# Patient Record
Sex: MALE | Race: BLACK OR AFRICAN AMERICAN | NOT HISPANIC OR LATINO | URBAN - METROPOLITAN AREA
[De-identification: names, ages, dates, MRNs, and addresses within clinical notes are randomized per-mention and may not be internally consistent; named-entity substitution may affect disease eponyms.]

---

## 2018-08-17 ENCOUNTER — EMERGENCY (EMERGENCY)
Facility: HOSPITAL | Age: 23
LOS: 0 days | Discharge: HOME | End: 2018-08-17
Attending: EMERGENCY MEDICINE | Admitting: EMERGENCY MEDICINE
Payer: COMMERCIAL

## 2018-08-17 VITALS
HEART RATE: 66 BPM | DIASTOLIC BLOOD PRESSURE: 81 MMHG | SYSTOLIC BLOOD PRESSURE: 120 MMHG | OXYGEN SATURATION: 100 % | RESPIRATION RATE: 17 BRPM

## 2018-08-17 VITALS
RESPIRATION RATE: 18 BRPM | HEART RATE: 64 BPM | TEMPERATURE: 99 F | OXYGEN SATURATION: 100 % | DIASTOLIC BLOOD PRESSURE: 80 MMHG | SYSTOLIC BLOOD PRESSURE: 119 MMHG

## 2018-08-17 DIAGNOSIS — G56.32 LESION OF RADIAL NERVE, LEFT UPPER LIMB: ICD-10-CM

## 2018-08-17 PROCEDURE — 73090 X-RAY EXAM OF FOREARM: CPT | Mod: 26,LT

## 2018-08-17 PROCEDURE — 99283 EMERGENCY DEPT VISIT LOW MDM: CPT

## 2018-08-17 RX ORDER — SODIUM CHLORIDE 9 MG/ML
1000 INJECTION INTRAMUSCULAR; INTRAVENOUS; SUBCUTANEOUS ONCE
Qty: 0 | Refills: 0 | Status: DISCONTINUED | OUTPATIENT
Start: 2018-08-17 | End: 2018-08-17

## 2018-08-17 RX ORDER — SODIUM CHLORIDE 9 MG/ML
3 INJECTION INTRAMUSCULAR; INTRAVENOUS; SUBCUTANEOUS ONCE
Qty: 0 | Refills: 0 | Status: DISCONTINUED | OUTPATIENT
Start: 2018-08-17 | End: 2018-08-17

## 2018-08-17 RX ORDER — FAMOTIDINE 10 MG/ML
20 INJECTION INTRAVENOUS ONCE
Qty: 0 | Refills: 0 | Status: DISCONTINUED | OUTPATIENT
Start: 2018-08-17 | End: 2018-08-17

## 2018-08-17 NOTE — ED PROVIDER NOTE - OBJECTIVE STATEMENT
22 y/o male with no relevant PMHx  presents to the ED c/o  inability to extend left fingers today. No hx trauma. Pt flew in from [place]. No fever or any other acute complaints at this time.

## 2018-08-17 NOTE — ED ADULT NURSE NOTE - CHIEF COMPLAINT QUOTE
Pt states he has numbness in his left arm with pain in his left forearm, Pt states the numbness started while he was waiting to get on a plane on Mon 8/16, then took 2 flights to [place], numbness became worse and 2 flights back, arriving last night. Patient has full range of motion of his shoulder but is unable to flex/extend his left wrist fully and unable to wiggle his fingers at full motion. Pt has decreased radial pulse in left wrist compared to right radial pulse. Unequal strength of radial pulses. Capillary refill <3 seconds in left fingers, left hand is sweaty. Pt direct bed to main ED for evaluation.

## 2018-08-17 NOTE — ED PROVIDER NOTE - NEUROLOGICAL, MLM
Alert and oriented, no focal deficits. Median nerve intact, ulnar nerve intact, radial nerve-pt unable to extend all fingers on left hand.

## 2018-08-17 NOTE — ED ADULT NURSE NOTE - OBJECTIVE STATEMENT
pt began feeling a slight discomfort in left forearm before flying to/from [place] three days ago. Pt is unable to open his left hand more than 90% , can be opened without pain or resistance.

## 2018-08-17 NOTE — ED ADULT TRIAGE NOTE - CHIEF COMPLAINT QUOTE
Pt states he has numbness in his left arm with pain in his left forearm, Pt states the numbness started while he was waiting to get on a plane on Mon 8/16, then took 2 flights to [place], numbness became worse and 2 flights back, arriving last night. Patient has full range of motion of his shoulder but is unable to flex/extend his left wrist fully and unable to wiggle his fingers at full motion. Pt has decreased radial pulse in left wrist compared to right radial pulse. Unequal strength of radial pulses. Capillary refill <3 seconds in left fingers, left hand is sweaty. Pt direct bed to main ED for evaluation.

## 2019-01-12 ENCOUNTER — EMERGENCY (EMERGENCY)
Facility: HOSPITAL | Age: 24
LOS: 0 days | Discharge: HOME | End: 2019-01-12
Attending: EMERGENCY MEDICINE | Admitting: EMERGENCY MEDICINE
Payer: COMMERCIAL

## 2019-01-12 VITALS — HEIGHT: 61 IN | WEIGHT: 145.06 LBS

## 2019-01-12 VITALS
RESPIRATION RATE: 19 BRPM | SYSTOLIC BLOOD PRESSURE: 148 MMHG | TEMPERATURE: 99 F | DIASTOLIC BLOOD PRESSURE: 89 MMHG | OXYGEN SATURATION: 100 % | HEART RATE: 66 BPM

## 2019-01-12 DIAGNOSIS — N48.1 BALANITIS: ICD-10-CM

## 2019-01-12 PROCEDURE — 99283 EMERGENCY DEPT VISIT LOW MDM: CPT

## 2019-01-12 RX ORDER — AZTREONAM 2 G
1 VIAL (EA) INJECTION
Qty: 14 | Refills: 0 | OUTPATIENT
Start: 2019-01-12 | End: 2019-01-18

## 2019-01-12 RX ADMIN — Medication 1 TABLET(S): at 09:25

## 2019-01-12 NOTE — ED PROVIDER NOTE - OBJECTIVE STATEMENT
22 yo male pw blister and redness on the tip and shaft of his penis that started yesterday and is worsening today. no fever or chills. [facility] and has an appointment with a urologist in 3 days. no fever or chills. 24 yo male pw non painful non tender blister and redness on the tip and shaft of his penis that started yesterday and is worsening today. no fever or chills. [facility] and has an appointment with a urologist in 3 days. no fever or chills.

## 2019-01-12 NOTE — ED ADULT TRIAGE NOTE - CHIEF COMPLAINT QUOTE
patient reports cyst on genitals that started yesterday.  [facility] and told to follow up with urologist.  This morning patient states there is blood around the cyst. Denies drainage from cyst, fevers or pain

## 2019-01-12 NOTE — ED PROVIDER NOTE - CARE PROVIDER_API CALL
Charles Medina (MD), Urology  [address]  [address] [place] [address]  Phone: [phone]  Fax: [phone]

## 2019-01-12 NOTE — ED PROVIDER NOTE - MEDICAL DECISION MAKING DETAILS
pt with blister with surrounding redness of penis likely infection will get urology consult and give antibiotics

## 2021-03-31 ENCOUNTER — TRANSCRIPTION ENCOUNTER (OUTPATIENT)
Age: 26
End: 2021-03-31

## 2021-06-05 ENCOUNTER — TRANSCRIPTION ENCOUNTER (OUTPATIENT)
Age: 26
End: 2021-06-05

## 2021-06-10 NOTE — ED PROVIDER NOTE - CPE EDP SKIN NORM
Body Location Override (Optional - Billing Will Still Be Based On Selected Body Map Location If Applicable): left jawline
Detail Level: Detailed
Add 72799 Cpt? (Important Note: In 2017 The Use Of 80262 Is Being Tracked By Cms To Determine Future Global Period Reimbursement For Global Periods): yes
normal...

## 2022-05-01 ENCOUNTER — EMERGENCY (EMERGENCY)
Facility: HOSPITAL | Age: 27
LOS: 0 days | Discharge: HOME | End: 2022-05-01
Attending: HOSPITALIST
Payer: COMMERCIAL

## 2022-05-01 VITALS — WEIGHT: 147.93 LBS | HEIGHT: 61 IN

## 2022-05-01 VITALS
DIASTOLIC BLOOD PRESSURE: 73 MMHG | HEART RATE: 99 BPM | OXYGEN SATURATION: 100 % | SYSTOLIC BLOOD PRESSURE: 124 MMHG | RESPIRATION RATE: 20 BRPM | TEMPERATURE: 98 F

## 2022-05-01 DIAGNOSIS — J45.909 UNSPECIFIED ASTHMA, UNCOMPLICATED: ICD-10-CM

## 2022-05-01 DIAGNOSIS — Z88.2 ALLERGY STATUS TO SULFONAMIDES: ICD-10-CM

## 2022-05-01 DIAGNOSIS — I10 ESSENTIAL (PRIMARY) HYPERTENSION: ICD-10-CM

## 2022-05-01 DIAGNOSIS — R42 DIZZINESS AND GIDDINESS: ICD-10-CM

## 2022-05-01 DIAGNOSIS — R10.9 UNSPECIFIED ABDOMINAL PAIN: ICD-10-CM

## 2022-05-01 DIAGNOSIS — Z20.822 CONTACT WITH AND (SUSPECTED) EXPOSURE TO COVID-19: ICD-10-CM

## 2022-05-01 DIAGNOSIS — R11.2 NAUSEA WITH VOMITING, UNSPECIFIED: ICD-10-CM

## 2022-05-01 DIAGNOSIS — Z79.82 LONG TERM (CURRENT) USE OF ASPIRIN: ICD-10-CM

## 2022-05-01 DIAGNOSIS — K29.70 GASTRITIS, UNSPECIFIED, WITHOUT BLEEDING: ICD-10-CM

## 2022-05-01 PROCEDURE — 72125 CT NECK SPINE W/O DYE: CPT | Mod: 26,MA

## 2022-05-01 PROCEDURE — 70486 CT MAXILLOFACIAL W/O DYE: CPT | Mod: 26,MA

## 2022-05-01 PROCEDURE — 90715 TDAP VACCINE 7 YRS/> IM: CPT

## 2022-05-01 PROCEDURE — 76376 3D RENDER W/INTRP POSTPROCES: CPT | Mod: 26

## 2022-05-01 PROCEDURE — 70486 CT MAXILLOFACIAL W/O DYE: CPT | Mod: MA

## 2022-05-01 PROCEDURE — 70450 CT HEAD/BRAIN W/O DYE: CPT | Mod: 26,MA

## 2022-05-01 PROCEDURE — 99285 EMERGENCY DEPT VISIT HI MDM: CPT | Mod: 25

## 2022-05-01 PROCEDURE — 76376 3D RENDER W/INTRP POSTPROCES: CPT

## 2022-05-01 PROCEDURE — 99285 EMERGENCY DEPT VISIT HI MDM: CPT

## 2022-05-01 PROCEDURE — 72125 CT NECK SPINE W/O DYE: CPT | Mod: MA

## 2022-05-01 PROCEDURE — 70450 CT HEAD/BRAIN W/O DYE: CPT | Mod: MA

## 2022-05-01 RX ORDER — TETANUS TOXOID, REDUCED DIPHTHERIA TOXOID AND ACELLULAR PERTUSSIS VACCINE, ADSORBED 5; 2.5; 8; 8; 2.5 [IU]/.5ML; [IU]/.5ML; UG/.5ML; UG/.5ML; UG/.5ML
0.5 SUSPENSION INTRAMUSCULAR ONCE
Refills: 0 | Status: COMPLETED | OUTPATIENT
Start: 2022-05-01 | End: 2022-05-01

## 2022-05-01 RX ADMIN — TETANUS TOXOID, REDUCED DIPHTHERIA TOXOID AND ACELLULAR PERTUSSIS VACCINE, ADSORBED 0.5 MILLILITER(S): 5; 2.5; 8; 8; 2.5 SUSPENSION INTRAMUSCULAR at 03:55

## 2022-05-01 NOTE — ED PROVIDER NOTE - PATIENT PORTAL LINK FT
You can access the FollowMyHealth Patient Portal offered by [facility] by registering at the following website: [email or web address]. By joining [facility]’s FollowMyHealth portal, you will also be able to view your health information using other applications (apps) compatible with our system.
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
## 2022-05-01 NOTE — ED PROVIDER NOTE - NSFOLLOWUPINSTRUCTIONS_ED_ALL_ED_FT
Take tylenol as needed for pain, 650Mg every 6-8 hours.  You can also take ibuprofen as needed for pain, 600mg every 6-8 hours, take with food.  Please do not apply any creams or ointments to stitches or surgical glue. the water can run over these area but do not scrub  please return in 1 week for removal of stitches.

## 2022-05-01 NOTE — ED PROVIDER NOTE - ENMT, MLM
Airway patent, Nasal mucosa clear. no nasal septal hematoma. Mouth with normal mucosa and normal alignment of jaw. no missing/loose or broken teeth. Throat has no vesicles, no oropharyngeal exudates and uvula is midline. no hemotympanum.
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
## 2022-05-01 NOTE — ED PROVIDER NOTE - SKIN [+], MLM
lac to forehead, bridge of nose and upper left cheek. abrasion to back of lower left scalp/ABRASION/LACERATION

## 2022-05-01 NOTE — ED PROVIDER NOTE - PROGRESS NOTE DETAILS
Julian GARAY: CT results noted and d/w patient. did not expresses interest in oupt Plastic Surgery referral for broken nasal bone. evaluated again for nasal septal hematoma which is not seen on exam. wounds cleansed and repaired. pls see procedure note.

## 2022-05-01 NOTE — ED PROVIDER NOTE - EYES, MLM
Clear bilaterally, pupils equal, round and reactive to light. no hyphema. + subconjunctival hemorrhage on the left eye

## 2022-05-08 ENCOUNTER — EMERGENCY (EMERGENCY)
Facility: HOSPITAL | Age: 27
LOS: 0 days | Discharge: HOME | End: 2022-05-08
Attending: EMERGENCY MEDICINE
Payer: COMMERCIAL

## 2022-05-08 VITALS
SYSTOLIC BLOOD PRESSURE: 126 MMHG | HEART RATE: 84 BPM | TEMPERATURE: 99 F | DIASTOLIC BLOOD PRESSURE: 81 MMHG | RESPIRATION RATE: 16 BRPM | OXYGEN SATURATION: 98 %

## 2022-05-08 VITALS — WEIGHT: 147.93 LBS | HEIGHT: 61 IN

## 2022-05-08 DIAGNOSIS — S01.81XD LACERATION WITHOUT FOREIGN BODY OF OTHER PART OF HEAD, SUBSEQUENT ENCOUNTER: ICD-10-CM
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
## 2022-05-08 PROCEDURE — G0463: CPT

## 2022-05-08 PROCEDURE — L9995: CPT

## 2022-05-08 NOTE — ED STATDOCS - NSFOLLOWUPINSTRUCTIONS_ED_ALL_ED_FT
Stitches Removal    WHAT YOU NEED TO KNOW:    What do I need to know about stitches removal? Stitches are usually removed within 14 days, depending on the location of the wound. Your healthcare provider will tell you when to return to have your stitches removed. Your provider will use sterile forceps or tweezers to  the knot of each stitch. He or she will cut the stitch with scissors and pull the stitch out. You may feel a slight tug as the stitch comes out.    What can I do to care for the area after the stitches are removed?   •Do not pull medical tape off. Your provider may place small strips of medical tape across your wound after the stitches have been removed. These strips will peel and fall of on their own. Do not pull them off.      •Clean the area as directed. Carefully wash the area with soap and water. Pat the area dry with a clean towel. Check the area for signs of infection, such as redness, swelling, or pus. Also check that the wound is not coming apart.      •Protect your wound. Your wound can swell, bleed, or split open if it is stretched or bumped. You may need to wear a bandage that supports your wound until it is completely healed.      •Care for a scar. You may have a scar after the stitches are removed. Use sunblock if the area is exposed to the sun. Apply it every day after the stitches are removed. This will help prevent skin discoloration. Talk to your healthcare provider about medicines you can use to make the scar less visible. Some medicines are available without a prescription.      When should I seek immediate care?   •Your wound splits open or is starting to come apart.      •You suddenly cannot move your injured joint.      •You have sudden numbness around your wound.      •You see red streaks coming from your wound.      When should I contact my healthcare provider?   •You have a fever and chills.      •Your wound is red, warm, swollen, or leaking pus.      •There is a bad smell coming from your wound.      •You have increased pain in the wound area.      •You have questions or concerns about your condition or care.      CARE AGREEMENT:    You have the right to help plan your care. Learn about your health condition and how it may be treated. Discuss treatment options with your healthcare providers to decide what care you want to receive. You always have the right to refuse treatment

## 2022-05-08 NOTE — ED STATDOCS - NS ED ATTENDING STATEMENT MOD
This was a shared visit with the JAIRO. I reviewed and verified the documentation and independently performed the documented:

## 2022-05-08 NOTE — ED ADULT TRIAGE NOTE - ESI TRIAGE ACUITY LEVEL, MLM
"  lisinopril (PRINIVIL/ZESTRIL) 5 MG tablet 90 tablet 0 6/22/2018       Last Written Prescription Date:  06/22/2018  Last Fill Quantity: 90,  # refills: 0   Last office visit: 8/18/2017 with prescribing provider:     Future Office Visit: Unknown  Next 5 appointments (look out 90 days)     Oct 02, 2018 10:15 AM CDT   Return Visit with Satya Rogers MD   [facility] ([facility])    [address] [place] [id number] [phone] OPT 2                 Requested Prescriptions   Pending Prescriptions Disp Refills     lisinopril (PRINIVIL/ZESTRIL) 5 MG tablet [Pharmacy Med Name: LISINOPRIL 5MG TABLETS] 90 tablet 0     Sig: TAKE 1 TABLET BY MOUTH EVERY DAY    ACE Inhibitors (Including Combos) Protocol Passed    8/8/2018 10:26 AM       Passed - Blood pressure under 140/90 in past 12 months    BP Readings from Last 3 Encounters:   08/18/17 121/83   06/08/17 126/76   05/25/17 111/82                Passed - Recent (12 mo) or future (30 days) visit within the authorizing provider's specialty    Patient had office visit in the last 12 months or has a visit in the next 30 days with authorizing provider or within the authorizing provider's specialty.  See \"Patient Info\" tab in inbasket, or \"Choose Columns\" in Meds & Orders section of the refill encounter.           Passed - Patient is age 18 or older       Passed - Normal serum creatinine on file in past 12 months    Recent Labs   Lab Test  08/18/17   1030   CR  0.80            Passed - Normal serum potassium on file in past 12 months    Recent Labs   Lab Test  08/18/17   1030   POTASSIUM  4.0               " 5

## 2022-05-08 NOTE — ED STATDOCS - SKIN, MLM
skin normal color for race, warm,+1 cm healing wound to forehead and 1cm healing wound to bridge on nose, no wound dehiscence, no sign of infection.

## 2022-05-08 NOTE — ED STATDOCS - PATIENT PORTAL LINK FT
You can access the FollowMyHealth Patient Portal offered by [facility] by registering at the following website: [email or web address]. By joining [facility]’s FollowMyHealth portal, you will also be able to view your health information using other applications (apps) compatible with our system.

## 2022-06-27 ENCOUNTER — EMERGENCY (EMERGENCY)
Facility: HOSPITAL | Age: 27
LOS: 0 days | Discharge: HOME | End: 2022-06-27
Attending: EMERGENCY MEDICINE
Payer: COMMERCIAL

## 2022-06-27 VITALS
RESPIRATION RATE: 18 BRPM | DIASTOLIC BLOOD PRESSURE: 82 MMHG | TEMPERATURE: 99 F | SYSTOLIC BLOOD PRESSURE: 133 MMHG | HEART RATE: 68 BPM | WEIGHT: 197.98 LBS | HEIGHT: 61 IN | OXYGEN SATURATION: 98 %

## 2022-06-27 VITALS
DIASTOLIC BLOOD PRESSURE: 80 MMHG | SYSTOLIC BLOOD PRESSURE: 128 MMHG | HEART RATE: 72 BPM | OXYGEN SATURATION: 98 % | RESPIRATION RATE: 18 BRPM

## 2022-06-27 DIAGNOSIS — L02.411 CUTANEOUS ABSCESS OF RIGHT AXILLA: ICD-10-CM

## 2022-06-27 PROCEDURE — 10061 I&D ABSCESS COMP/MULTIPLE: CPT

## 2022-06-27 PROCEDURE — 99283 EMERGENCY DEPT VISIT LOW MDM: CPT | Mod: 25

## 2022-06-27 PROCEDURE — 87070 CULTURE OTHR SPECIMN AEROBIC: CPT

## 2022-06-27 PROCEDURE — 87075 CULTR BACTERIA EXCEPT BLOOD: CPT

## 2022-06-27 PROCEDURE — 87077 CULTURE AEROBIC IDENTIFY: CPT

## 2022-06-27 NOTE — ED STATDOCS - NSFOLLOWUPINSTRUCTIONS_ED_ALL_ED_FT
REMOVE YOUR PACKING IN 2 DAYS. FOLLOW UP WITH A SURGEON IN 1 WEEK. RETURN TO ER FOR ANY WORSENING SYMPTOMS OR NEW CONCERNS.     Skin Abscess   A skin abscess is an infected area on or under your skin that contains pus and other material. An abscess can happen almost anywhere on your body. Some abscesses break open (rupture) on their own. Most continue to get worse unless they are treated. The infection can spread deeper into the body and into your blood, which can make you feel sick. Treatment usually involves draining the abscess.    Follow these instructions at home:  Abscess Care     If you have an abscess that has not drained, place a warm, clean, wet washcloth over the abscess several times a day. Do this as told by your doctor.  Follow instructions from your doctor about how to take care of your abscess. Make sure you:    Cover the abscess with a bandage (dressing).  Change your bandage or gauze as told by your doctor.  Wash your hands with soap and water before you change the bandage or gauze. If you cannot use soap and water, use hand .    Check your abscess every day for signs that the infection is getting worse. Check for:    More redness, swelling, or pain.  More fluid or blood.  Warmth.  More pus or a bad smell.    Medicines     Image   Take over-the-counter and prescription medicines only as told by your doctor.  If you were prescribed an antibiotic medicine, take it as told by your doctor. Do not stop taking the antibiotic even if you start to feel better.  General instructions     To avoid spreading the infection:    Do not share personal care items, towels, or hot tubs with others.  Avoid making skin-to-skin contact with other people.    Keep all follow-up visits as told by your doctor. This is important.  Contact a doctor if:  You have more redness, swelling, or pain around your abscess.  You have more fluid or blood coming from your abscess.  Your abscess feels warm when you touch it.  You have more pus or a bad smell coming from your abscess.  You have a fever.  Your muscles ache.  You have chills.  You feel sick.  Get help right away if:  You have very bad (severe) pain.  You see red streaks on your skin spreading away from the abscess.

## 2022-06-27 NOTE — ED STATDOCS - ATTENDING APP SHARED VISIT CONTRIBUTION OF CARE
I personally saw the patient with the JAIRO, and completed the key components of the history and physical exam. I then discussed the management plan with the JAIRO.

## 2022-06-27 NOTE — ED ADULT NURSE NOTE - CHIEF COMPLAINT QUOTE
Daily Assessment c/o right axillary abscess for past week, has been on antibiotic for past week, abscess opened with drainage, swelling increased, c/o pain at site

## 2022-06-27 NOTE — ED ADULT TRIAGE NOTE - CHIEF COMPLAINT QUOTE
c/o right axillary abscess for past week, has been on antibiotic for past week, abscess opened with drainage, swelling increased, c/o pain at site

## 2022-06-27 NOTE — ED STATDOCS - SKIN, MLM
skin normal color for race, warm, dry. 4.5x2.5cm area of fluctuance right axilla. +erythema, +tenderness.

## 2022-06-27 NOTE — ED STATDOCS - PROGRESS NOTE DETAILS
signed Helen May PA-C Pt seen initially in intake by Dr. Tiwari   27m with right axiallry painful swollen region x 1 week. no trauma or abx, no fever. Pt with draining abscess, I&D in ED with lots of gray sebaceous material. Start on doxy. Packing placed, cx sent. pt feeling better. Possibly infected cyst. recommend pt remove packing in 2 days and f/u with sx. return precautions given. Pt feeling well at DC, agrees with DC and plan of care.

## 2022-06-27 NOTE — ED ADULT NURSE NOTE - NSIMPLEMENTINTERV_GEN_ALL_ED
Implemented All Universal Safety Interventions:  [place] to call system. Call bell, personal items and telephone within reach. Instruct patient to call for assistance. Room bathroom lighting operational. Non-slip footwear when patient is off stretcher. Physically safe environment: no spills, clutter or unnecessary equipment. Stretcher in lowest position, wheels locked, appropriate side rails in place.

## 2022-06-27 NOTE — ED STATDOCS - OBJECTIVE STATEMENT
26 y/o male presents to the ED for right axillary abscess x1 week. Pt was seen at urgent care and has been on Cefadroxil. Nonsmoker. No other complaints at this time.

## 2022-06-27 NOTE — ED STATDOCS - PATIENT PORTAL LINK FT
You can access the FollowMyHealth Patient Portal offered by [facility] by registering at the following website: [email or web address]. By joining [facility]’s FollowMyHealth portal, you will also be able to view your health information using other applications (apps) compatible with our system.

## 2022-06-27 NOTE — ED STATDOCS - CARE PROVIDER_API CALL
Shaka Lambert (DO)  Surgery; Surgical Critical Care  [address], [address] [place] [address]  Phone: [phone]  Fax: [phone]  Follow Up Time:

## 2022-06-27 NOTE — ED ADULT NURSE NOTE - SKIN TURGOR
resilient/elastic I have personally performed a face to face diagnostic evaluation on this patient. I have reviewed the ACP note and agree with the history, exam and plan of care, except as noted.

## 2022-06-29 NOTE — ED POST DISCHARGE NOTE - REASON FOR FOLLOW-UP
pt called for rx states rx was 180$, rx for extended release doxy 120mg sent, ?dosage, sent doxy 100mg BID as per note it is for abscess  Elvia Salazar PA-C Other

## 2023-02-11 NOTE — ED PROVIDER NOTE - PROGRESS NOTE DETAILS
Jimmy Bailon(PA) spoke with urologist Dr. Charles Medina, does not recommend draining blister like lesion, recommends urology follow up and bactrim fro antibiotic.

## 2023-10-23 ENCOUNTER — APPOINTMENT (OUTPATIENT)
Dept: HUMAN REPRODUCTION | Facility: CLINIC | Age: 28
End: 2023-10-23

## 2025-06-28 ENCOUNTER — EMERGENCY (EMERGENCY)
Facility: HOSPITAL | Age: 30
LOS: 1 days | End: 2025-06-28
Payer: SELF-PAY

## 2025-06-28 VITALS
RESPIRATION RATE: 18 BRPM | SYSTOLIC BLOOD PRESSURE: 131 MMHG | TEMPERATURE: 98 F | HEART RATE: 85 BPM | OXYGEN SATURATION: 99 % | DIASTOLIC BLOOD PRESSURE: 81 MMHG

## 2025-06-28 PROCEDURE — L9991: CPT

## 2025-07-04 DIAGNOSIS — Z53.21 PROCEDURE AND TREATMENT NOT CARRIED OUT DUE TO PATIENT LEAVING PRIOR TO BEING SEEN BY HEALTH CARE PROVIDER: ICD-10-CM

## 2025-07-04 DIAGNOSIS — S01.111D LACERATION WITHOUT FOREIGN BODY OF RIGHT EYELID AND PERIOCULAR AREA, SUBSEQUENT ENCOUNTER: ICD-10-CM
